# Patient Record
Sex: MALE | Race: WHITE | NOT HISPANIC OR LATINO | Employment: OTHER | URBAN - METROPOLITAN AREA
[De-identification: names, ages, dates, MRNs, and addresses within clinical notes are randomized per-mention and may not be internally consistent; named-entity substitution may affect disease eponyms.]

---

## 2024-03-06 ENCOUNTER — OFFICE VISIT (OUTPATIENT)
Dept: CARDIOLOGY | Facility: CLINIC | Age: 89
End: 2024-03-06
Payer: MEDICARE

## 2024-03-06 ENCOUNTER — HOSPITAL ENCOUNTER (OUTPATIENT)
Dept: CARDIOLOGY | Facility: CLINIC | Age: 89
Discharge: HOME | End: 2024-03-06
Payer: MEDICARE

## 2024-03-06 VITALS
SYSTOLIC BLOOD PRESSURE: 163 MMHG | DIASTOLIC BLOOD PRESSURE: 67 MMHG | BODY MASS INDEX: 23.62 KG/M2 | WEIGHT: 174.4 LBS | HEART RATE: 80 BPM | HEIGHT: 72 IN

## 2024-03-06 DIAGNOSIS — Z86.718 HISTORY OF DVT (DEEP VEIN THROMBOSIS): ICD-10-CM

## 2024-03-06 DIAGNOSIS — I10 PRIMARY HYPERTENSION: ICD-10-CM

## 2024-03-06 DIAGNOSIS — I48.0 PAROXYSMAL ATRIAL FIBRILLATION (MULTI): ICD-10-CM

## 2024-03-06 DIAGNOSIS — I48.0 PAROXYSMAL ATRIAL FIBRILLATION (MULTI): Primary | ICD-10-CM

## 2024-03-06 PROCEDURE — 1160F RVW MEDS BY RX/DR IN RCRD: CPT | Performed by: NURSE PRACTITIONER

## 2024-03-06 PROCEDURE — 3077F SYST BP >= 140 MM HG: CPT | Performed by: NURSE PRACTITIONER

## 2024-03-06 PROCEDURE — 93246 EXT ECG>7D<15D RECORDING: CPT

## 2024-03-06 PROCEDURE — 1159F MED LIST DOCD IN RCRD: CPT | Performed by: NURSE PRACTITIONER

## 2024-03-06 PROCEDURE — 99215 OFFICE O/P EST HI 40 MIN: CPT | Performed by: NURSE PRACTITIONER

## 2024-03-06 PROCEDURE — 1036F TOBACCO NON-USER: CPT | Performed by: NURSE PRACTITIONER

## 2024-03-06 PROCEDURE — 93248 EXT ECG>7D<15D REV&INTERPJ: CPT | Performed by: INTERNAL MEDICINE

## 2024-03-06 PROCEDURE — 3078F DIAST BP <80 MM HG: CPT | Performed by: NURSE PRACTITIONER

## 2024-03-06 RX ORDER — DILTIAZEM HYDROCHLORIDE 120 MG/1
120 CAPSULE, EXTENDED RELEASE ORAL
COMMUNITY
Start: 2017-02-20

## 2024-03-06 RX ORDER — CLONAZEPAM 0.5 MG/1
0.5 TABLET ORAL
COMMUNITY
Start: 2017-03-26

## 2024-03-06 RX ORDER — METOPROLOL SUCCINATE 50 MG/1
25 TABLET, EXTENDED RELEASE ORAL
COMMUNITY
Start: 2024-02-22 | End: 2024-03-23

## 2024-03-06 RX ORDER — DOXAZOSIN 8 MG/1
1 TABLET ORAL NIGHTLY
COMMUNITY
Start: 2018-11-30

## 2024-03-06 RX ORDER — BENAZEPRIL HYDROCHLORIDE 10 MG/1
10 TABLET ORAL
COMMUNITY

## 2024-03-06 RX ORDER — ATORVASTATIN CALCIUM 40 MG/1
40 TABLET, FILM COATED ORAL
COMMUNITY
Start: 2017-01-23

## 2024-03-06 RX ORDER — MAGNESIUM GLUCONATE 27 MG(500)
TABLET ORAL
COMMUNITY

## 2024-03-06 RX ORDER — IBUPROFEN/PSEUDOEPHEDRINE HCL 200MG-30MG
TABLET ORAL
COMMUNITY

## 2024-03-06 NOTE — PROGRESS NOTES
Chief Complaint:   Hospital Follow Up     History Of Present Illness:    Ruy Horton is a 89 y.o. male here for hospital follow up. Presented to ED with short episode of palpitations, SOB and weakness. EKG in ED showed afib at 90bpm. EP evaluated. Reported hx of afib. Was taken off AC due to ICH. Echo, reportedly, showed normal EF and no significant valvular abnormalities. I am unsure when he was first dx with afib. He is unaware of when he was first diagnosed with afib. We placed a holter in 2022 which was negative for afib.     He reports that when he was on diltiazem 180mg he started not to feel well. Reports episodes that felt like a panic attack.   It was decreased to 120mg and felt better. Until the episode occurred that lead to him going to ED. Again described as a panic attack.     Home heart rates 50-60s    Allergies:  Patient has no allergy information on record.    Review of Systems  All pertinent systems have been reviewed and are negative except for what is stated in the history of present illness.    All other systems have been reviewed and are negative and noncontributory to this patient's current ailments.     Visit Vitals  /67 (BP Location: Right arm, Patient Position: Sitting, BP Cuff Size: Adult)   Pulse 80   Ht 1.829 m (6')   Wt 79.1 kg (174 lb 6.4 oz)   BMI 23.65 kg/m²   Smoking Status Never   BSA 2 m²       Objective   Vitals reviewed.   Constitutional:       Appearance: Healthy appearance. Not in distress.   Neck:      Vascular: No JVR. JVD normal.   Pulmonary:      Effort: Pulmonary effort is normal.      Breath sounds: Normal breath sounds. No wheezing. No rhonchi. No rales.   Chest:      Chest wall: Not tender to palpatation.   Cardiovascular:      PMI at left midclavicular line. Normal rate. Regular rhythm. Normal S1. Normal S2.       Murmurs: There is no murmur.      No gallop.  No click. No rub.   Edema:     Peripheral edema absent.   Abdominal:      General: Bowel sounds are  normal.      Palpations: Abdomen is soft.      Tenderness: There is no abdominal tenderness.   Musculoskeletal:         General: No tenderness. Skin:     General: Skin is warm and dry.   Neurological:      General: No focal deficit present.      Mental Status: Alert and oriented to person, place and time.       Assessment/Plan   Diagnoses and all orders for this visit:  Paroxysmal atrial fibrillation (CMS/HCC)  - 2 week holter  - in NSR   - AC was not started due hx if ICH. I am not sure if he would be a candidate for watchman due to ICH. Will review with EP  - [id number]  - continue diltiazem and metoprolol   Primary hypertension  - stable   - he will bring readings to next apt  History of DVT (deep vein thrombosis)  -  see above  - hx of ICH      Current Outpatient Medications:     atorvastatin (Lipitor) 40 mg tablet, Take 1 tablet (40 mg) by mouth once daily., Disp: , Rfl:     benazepril (Lotensin) 10 mg tablet, Take 1 tablet (10 mg) by mouth once daily., Disp: , Rfl:     Cartia  mg 24 hr capsule, Take 1 capsule (120 mg) by mouth once daily., Disp: , Rfl:     clonazePAM (KlonoPIN) 0.5 mg tablet, Take 1 tablet (0.5 mg) by mouth., Disp: , Rfl:     doxazosin (Cardura) 8 mg tablet, Take 1 tablet (8 mg) by mouth once daily at bedtime., Disp: , Rfl:     Lactobac 40-Bifido 3-S.thermop (Probiotic) 100 billion cell capsule, Take by mouth., Disp: , Rfl:     magnesium, as gluconate, (Magonate) 27 mg magnesium (500 mg) tablet, Take by mouth., Disp: , Rfl:     metoprolol succinate XL (Toprol XL) 50 mg 24 hr tablet, Take 0.5 tablets (25 mg) by mouth once daily., Disp: , Rfl:     Exclusive of any other services or procedures performed, I, Susan BARBOUR, spent 40 minutes in duration for this visit today.  This time consisted of chart review, obtaining history, and/or performing the exam as documented above, as well as, documenting the clinical information for the encounter in the electronic record, discussing  treatment options, plans, and/or goals with patient, family, and/or caregiver, refilling medications, updating the electronic record, ordering medicines, lab work, imaging, referrals, and/or procedures as documented above and communicating with other [facility] professionals. I have discussed the results of laboratory, radiology, and cardiology studies with the patient and their family/caregiver.

## 2024-03-28 ENCOUNTER — TELEPHONE (OUTPATIENT)
Dept: CARDIOLOGY | Facility: CLINIC | Age: 89
End: 2024-03-28
Payer: MEDICARE

## 2024-03-28 LAB — BODY SURFACE AREA: 2 M2

## 2024-03-28 NOTE — TELEPHONE ENCOUNTER
----- Message from KM Corbett sent at 3/28/2024  9:52 AM EDT -----  Pls schedule follow up visit for review monitor

## 2024-04-03 ENCOUNTER — APPOINTMENT (OUTPATIENT)
Dept: CARDIOLOGY | Facility: CLINIC | Age: 89
End: 2024-04-03
Payer: MEDICARE

## 2024-04-03 NOTE — PROGRESS NOTES
Chief Complaint:   Hospital Follow Up     History Of Present Illness:    Ruy Horton is a 89 y.o. male here for hospital follow up. Presented to ED with short episode of palpitations, SOB and weakness. EKG in ED showed afib at 90bpm. EP evaluated. Reported hx of afib. Was taken off AC due to ICH. Echo, reportedly, showed normal EF and no significant valvular abnormalities. I am unsure when he was first dx with afib. He is unaware of when he was first diagnosed with afib. We placed a holter in 2022 which was negative for afib. Holter placed at last visit which showed SB with nonsustained PSVT and rare pac.     He reports that when he was on diltiazem 180mg he started not to feel well. Reports episodes that felt like a panic attack.   It was decreased to 120mg and felt better. Until the episode occurred that lead to him going to ED. Again described as a panic attack.     Home heart rates 50-60s    Allergies:  Patient has no known allergies.    Review of Systems  All pertinent systems have been reviewed and are negative except for what is stated in the history of present illness.    All other systems have been reviewed and are negative and noncontributory to this patient's current ailments.     Vitals reviewed.   Constitutional:       Appearance: Healthy appearance. Not in distress.   Neck:      Vascular: No JVR. JVD normal.   Pulmonary:      Effort: Pulmonary effort is normal.      Breath sounds: Normal breath sounds. No wheezing. No rhonchi. No rales.   Chest:      Chest wall: Not tender to palpatation.   Cardiovascular:      PMI at left midclavicular line. Normal rate. Regular rhythm. Normal S1. Normal S2.       Murmurs: There is no murmur.      No gallop.  No click. No rub.   Edema:     Peripheral edema absent.   Abdominal:      General: Bowel sounds are normal.      Palpations: Abdomen is soft.      Tenderness: There is no abdominal tenderness.   Musculoskeletal:         General: No tenderness. Skin:     General:  Skin is warm and dry.   Neurological:      General: No focal deficit present.      Mental Status: Alert and oriented to person, place and time.       Assessment/Plan   Diagnoses and all orders for this visit:  Paroxysmal atrial fibrillation (CMS/HCC)  - 2 week holter  - in NSR   - AC was not started due hx if ICH. I am not sure if he would be a candidate for watchman due to ICH. Will review with EP  - [id number]  - continue diltiazem and metoprolol   Primary hypertension  - stable   - he will bring readings to next apt  History of DVT (deep vein thrombosis)  -  see above  - hx of ICH      Current Outpatient Medications:     atorvastatin (Lipitor) 40 mg tablet, Take 1 tablet (40 mg) by mouth once daily., Disp: , Rfl:     benazepril (Lotensin) 10 mg tablet, Take 1 tablet (10 mg) by mouth once daily., Disp: , Rfl:     Cartia  mg 24 hr capsule, Take 1 capsule (120 mg) by mouth once daily., Disp: , Rfl:     clonazePAM (KlonoPIN) 0.5 mg tablet, Take 1 tablet (0.5 mg) by mouth., Disp: , Rfl:     doxazosin (Cardura) 8 mg tablet, Take 1 tablet (8 mg) by mouth once daily at bedtime., Disp: , Rfl:     Lactobac 40-Bifido 3-S.thermop (Probiotic) 100 billion cell capsule, Take by mouth., Disp: , Rfl:     magnesium, as gluconate, (Magonate) 27 mg magnesium (500 mg) tablet, Take by mouth., Disp: , Rfl:     metoprolol succinate XL (Toprol XL) 50 mg 24 hr tablet, Take 0.5 tablets (25 mg) by mouth once daily., Disp: , Rfl:     Exclusive of any other services or procedures performed, I, Susan BARBOUR, spent 40 minutes in duration for this visit today.  This time consisted of chart review, obtaining history, and/or performing the exam as documented above, as well as, documenting the clinical information for the encounter in the electronic record, discussing treatment options, plans, and/or goals with patient, family, and/or caregiver, refilling medications, updating the electronic record, ordering medicines, lab work,  imaging, referrals, and/or procedures as documented above and communicating with other [facility] professionals. I have discussed the results of laboratory, radiology, and cardiology studies with the patient and their family/caregiver.

## 2024-04-10 ENCOUNTER — OFFICE VISIT (OUTPATIENT)
Dept: CARDIOLOGY | Facility: CLINIC | Age: 89
End: 2024-04-10
Payer: MEDICARE

## 2024-04-10 VITALS
HEART RATE: 81 BPM | SYSTOLIC BLOOD PRESSURE: 166 MMHG | DIASTOLIC BLOOD PRESSURE: 66 MMHG | BODY MASS INDEX: 24.45 KG/M2 | HEIGHT: 72 IN | WEIGHT: 180.5 LBS

## 2024-04-10 DIAGNOSIS — I48.0 PAROXYSMAL ATRIAL FIBRILLATION (MULTI): Primary | ICD-10-CM

## 2024-04-10 DIAGNOSIS — Z86.718 HISTORY OF DVT (DEEP VEIN THROMBOSIS): ICD-10-CM

## 2024-04-10 DIAGNOSIS — I10 PRIMARY HYPERTENSION: ICD-10-CM

## 2024-04-10 PROCEDURE — 1159F MED LIST DOCD IN RCRD: CPT | Performed by: NURSE PRACTITIONER

## 2024-04-10 PROCEDURE — 1160F RVW MEDS BY RX/DR IN RCRD: CPT | Performed by: NURSE PRACTITIONER

## 2024-04-10 PROCEDURE — 3077F SYST BP >= 140 MM HG: CPT | Performed by: NURSE PRACTITIONER

## 2024-04-10 PROCEDURE — 3078F DIAST BP <80 MM HG: CPT | Performed by: NURSE PRACTITIONER

## 2024-04-10 PROCEDURE — 99214 OFFICE O/P EST MOD 30 MIN: CPT | Performed by: NURSE PRACTITIONER

## 2024-04-10 PROCEDURE — 1036F TOBACCO NON-USER: CPT | Performed by: NURSE PRACTITIONER

## 2024-04-10 NOTE — PROGRESS NOTES
Chief Complaint:   Hospital Follow Up     History Of Present Illness:    Ruy Horton is a 89 y.o. male here for hospital follow up. Presented to ED with short episode of palpitations, SOB and weakness. EKG in ED showed afib at 90bpm. EP evaluated. Reported hx of afib. Was taken off AC due to ICH. Echo, reportedly, showed normal EF and no significant valvular abnormalities. I am unsure when he was first dx with afib. He is unaware of when he was first diagnosed with afib. We placed a holter in 2022 which was negative for afib.     He reports that when he was on diltiazem 180mg he started not to feel well. Reports episodes that felt like a panic attack.   It was decreased to 120mg and felt better. Until the episode occurred that lead to him going to ED. Again described as a panic attack. He has only had one episode since last office visit. He is feeling well overall.    Home heart rates 50-60s. Avg HR 59, lowest 38bpm at 0754    Past Medical History:  He has no past medical history on file.    Past Surgical History:  He has no past surgical history on file.      Social History:  He reports that he has never smoked. He has never used smokeless tobacco. No history on file for alcohol use and drug use.    Family History:  Family History   Problem Relation Name Age of Onset    Hypertension Father      Hyperlipidemia Father         Allergies:  Patient has no known allergies.    Review of Systems  All pertinent systems have been reviewed and are negative except for what is stated in the history of present illness.    All other systems have been reviewed and are negative and noncontributory to this patient's current ailments.     Visit Vitals  /66 (BP Location: Right arm, Patient Position: Sitting, BP Cuff Size: Adult)   Pulse 81   Ht 1.829 m (6')   Wt 81.9 kg (180 lb 8 oz)   BMI 24.48 kg/m²   Smoking Status Never   BSA 2.04 m²       Objective   Vitals reviewed.   Constitutional:       Appearance: Healthy appearance.  Not in distress.   Neck:      Vascular: No JVR. JVD normal.   Pulmonary:      Effort: Pulmonary effort is normal.      Breath sounds: Normal breath sounds. No wheezing. No rhonchi. No rales.   Chest:      Chest wall: Not tender to palpatation.   Cardiovascular:      PMI at left midclavicular line. Normal rate. Regular rhythm. Normal S1. Normal S2.       Murmurs: There is no murmur.      No gallop.  No click. No rub.   Edema:     Peripheral edema absent.   Abdominal:      General: Bowel sounds are normal.      Palpations: Abdomen is soft.      Tenderness: There is no abdominal tenderness.   Musculoskeletal:         General: No tenderness. Skin:     General: Skin is warm and dry.   Neurological:      General: No focal deficit present.      Mental Status: Alert and oriented to person, place and time.   Psychiatric:         Attention and Perception: Attention normal.         Mood and Affect: Mood normal.       Assessment/Plan   Diagnoses and all orders for this visit:  Paroxysmal atrial fibrillation (CMS/HCC)  - Monitor negative for afib  - in NSR   - AC was not started due hx if ICH. I did discuss with EP and he is a candidate for watchman, we did discuss.   - [id number]  - continue diltiazem and metoprolol   Primary hypertension  - stable   - monitor at home.   History of DVT (deep vein thrombosis)  -  see above  - hx of ICH    Follow up annually     Current Outpatient Medications:     atorvastatin (Lipitor) 40 mg tablet, Take 1 tablet (40 mg) by mouth once daily., Disp: , Rfl:     benazepril (Lotensin) 10 mg tablet, Take 1 tablet (10 mg) by mouth once daily., Disp: , Rfl:     Cartia  mg 24 hr capsule, Take 1 capsule (120 mg) by mouth once daily., Disp: , Rfl:     clonazePAM (KlonoPIN) 0.5 mg tablet, Take 1 tablet (0.5 mg) by mouth., Disp: , Rfl:     doxazosin (Cardura) 8 mg tablet, Take 1 tablet (8 mg) by mouth once daily at bedtime., Disp: , Rfl:     Lactobac 40-Bifido 3-S.thermop (Probiotic) 100 billion  cell capsule, Take by mouth., Disp: , Rfl:     magnesium, as gluconate, (Magonate) 27 mg magnesium (500 mg) tablet, Take by mouth., Disp: , Rfl:     metoprolol succinate XL (Toprol XL) 50 mg 24 hr tablet, Take 0.5 tablets (25 mg) by mouth once daily., Disp: , Rfl:     Exclusive of any other services or procedures performed, I, Susan BARBOUR, spent 40 minutes in duration for this visit today.  This time consisted of chart review, obtaining history, and/or performing the exam as documented above, as well as, documenting the clinical information for the encounter in the electronic record, discussing treatment options, plans, and/or goals with patient, family, and/or caregiver, refilling medications, updating the electronic record, ordering medicines, lab work, imaging, referrals, and/or procedures as documented above and communicating with other [facility] professionals. I have discussed the results of laboratory, radiology, and cardiology studies with the patient and their family/caregiver.

## 2024-04-17 ENCOUNTER — TELEPHONE (OUTPATIENT)
Dept: CARDIOLOGY | Facility: CLINIC | Age: 89
End: 2024-04-17
Payer: MEDICARE

## 2024-10-15 ENCOUNTER — HOSPITAL ENCOUNTER (OUTPATIENT)
Dept: CARDIOLOGY | Facility: CLINIC | Age: 89
Discharge: HOME | End: 2024-10-15
Payer: MEDICARE

## 2024-10-15 ENCOUNTER — TELEPHONE (OUTPATIENT)
Dept: CARDIOLOGY | Facility: CLINIC | Age: 89
End: 2024-10-15

## 2024-10-15 DIAGNOSIS — I48.0 PAROXYSMAL ATRIAL FIBRILLATION (MULTI): ICD-10-CM

## 2024-10-15 NOTE — TELEPHONE ENCOUNTER
Reviewed with pt verbalized understanding.     Would like monitor sent to his other address  [address], [address]  [place] [address]

## 2024-11-06 ENCOUNTER — TELEPHONE (OUTPATIENT)
Dept: CARDIOLOGY | Facility: CLINIC | Age: 89
End: 2024-11-06
Payer: MEDICARE

## 2024-11-06 NOTE — TELEPHONE ENCOUNTER
----- Message from Susan Iglesias sent at 11/6/2024  9:43 AM EST -----  Please get patient in to discuss monitor results.   He did not have any episodes of afib on monitor

## 2024-11-19 ENCOUNTER — OFFICE VISIT (OUTPATIENT)
Dept: CARDIOLOGY | Facility: CLINIC | Age: 89
End: 2024-11-19
Payer: MEDICARE

## 2024-11-19 VITALS
BODY MASS INDEX: 24.28 KG/M2 | HEART RATE: 89 BPM | SYSTOLIC BLOOD PRESSURE: 147 MMHG | DIASTOLIC BLOOD PRESSURE: 63 MMHG | WEIGHT: 179 LBS

## 2024-11-19 DIAGNOSIS — I48.0 PAROXYSMAL ATRIAL FIBRILLATION (MULTI): Primary | ICD-10-CM

## 2024-11-19 DIAGNOSIS — Z86.718 HISTORY OF DVT (DEEP VEIN THROMBOSIS): ICD-10-CM

## 2024-11-19 DIAGNOSIS — I10 PRIMARY HYPERTENSION: ICD-10-CM

## 2024-11-19 PROCEDURE — 3077F SYST BP >= 140 MM HG: CPT | Performed by: NURSE PRACTITIONER

## 2024-11-19 PROCEDURE — 99214 OFFICE O/P EST MOD 30 MIN: CPT | Performed by: NURSE PRACTITIONER

## 2024-11-19 PROCEDURE — 1160F RVW MEDS BY RX/DR IN RCRD: CPT | Performed by: NURSE PRACTITIONER

## 2024-11-19 PROCEDURE — 1036F TOBACCO NON-USER: CPT | Performed by: NURSE PRACTITIONER

## 2024-11-19 PROCEDURE — 3078F DIAST BP <80 MM HG: CPT | Performed by: NURSE PRACTITIONER

## 2024-11-19 PROCEDURE — 1159F MED LIST DOCD IN RCRD: CPT | Performed by: NURSE PRACTITIONER

## 2024-11-19 NOTE — PROGRESS NOTES
Chief Complaint:   Hospital Follow Up     History Of Present Illness:    Ruy Horton is a 89 y.o. male here for hospital follow up. Presented to ED with short episode of palpitations, SOB and weakness. EKG in ED showed afib at 90bpm. EP evaluated. Reported hx of afib. Was taken off AC due to ICH. Echo, reportedly, showed normal EF and no significant valvular abnormalities. I am unsure when he was first dx with afib. He is unaware of when he was first diagnosed with afib. We placed a holter in 2022 which was negative for afib.     He reports that when he was on diltiazem 180mg he started not to feel well. Reports episodes that felt like a panic attack.   It was decreased to 120mg and felt better. Until the episode occurred that lead to him going to ED. Again described as a panic attack. He has only had one episode since last office visit. He is feeling well overall.    Holter placed at last visit and showed Nonsustained PSVT, NSVT and rare pac/pvc.     Skips a beat every once awhile. He has not had another panic attack moment. Has been feeling fine.    Home heart rates 50-60s. Avg HR 59, lowest 38bpm at 0754    Past Medical History:  He has no past medical history on file.    Past Surgical History:  He has no past surgical history on file.      Social History:  He reports that he has never smoked. He has never used smokeless tobacco. No history on file for alcohol use and drug use.    Family History:  Family History   Problem Relation Name Age of Onset    Hypertension Father      Hyperlipidemia Father         Allergies:  Patient has no known allergies.    Review of Systems  All pertinent systems have been reviewed and are negative except for what is stated in the history of present illness.    All other systems have been reviewed and are negative and noncontributory to this patient's current ailments.     Visit Vitals  /63 (BP Location: Right arm, Patient Position: Sitting, BP Cuff Size: Adult)   Pulse 89   Wt  81.2 kg (179 lb)   BMI 24.28 kg/m²   Smoking Status Never   BSA 2.03 m²       Objective   Vitals reviewed.   Constitutional:       Appearance: Healthy appearance. Not in distress.   Neck:      Vascular: No JVR. JVD normal.   Pulmonary:      Effort: Pulmonary effort is normal.      Breath sounds: Normal breath sounds. No wheezing. No rhonchi. No rales.   Chest:      Chest wall: Not tender to palpatation.   Cardiovascular:      PMI at left midclavicular line. Normal rate. Regular rhythm. Normal S1. Normal S2.       Murmurs: There is no murmur.      No gallop.  No click. No rub.   Edema:     Peripheral edema absent.   Abdominal:      General: Bowel sounds are normal.      Palpations: Abdomen is soft.      Tenderness: There is no abdominal tenderness.   Musculoskeletal:         General: No tenderness. Skin:     General: Skin is warm and dry.   Neurological:      General: No focal deficit present.      Mental Status: Alert and oriented to person, place and time.   Psychiatric:         Attention and Perception: Attention normal.         Mood and Affect: Mood normal.       Assessment/Plan   Diagnoses and all orders for this visit:  Paroxysmal atrial fibrillation (CMS/HCC)  - Monitor negative for afib  - in NSR   - AC was not started due hx if ICH. I did discuss with EP and he is a candidate for watchman, if needed  - holter was negative for afib  - [id number]  - continue diltiazem and metoprolol   - panic attack sensation unlikely arrhythmia  Primary hypertension  - stable   - monitor at home.   History of DVT (deep vein thrombosis)  -  see above  - hx of ICH    Follow up annually     Current Outpatient Medications:     atorvastatin (Lipitor) 40 mg tablet, Take 1 tablet (40 mg) by mouth once daily., Disp: , Rfl:     benazepril (Lotensin) 10 mg tablet, Take 1 tablet (10 mg) by mouth once daily., Disp: , Rfl:     Cartia  mg 24 hr capsule, Take 1 capsule (120 mg) by mouth once daily., Disp: , Rfl:     clonazePAM  (KlonoPIN) 0.5 mg tablet, Take 1 tablet (0.5 mg) by mouth., Disp: , Rfl:     doxazosin (Cardura) 8 mg tablet, Take 1 tablet (8 mg) by mouth once daily at bedtime., Disp: , Rfl:     Lactobac 40-Bifido 3-S.thermop (Probiotic) 100 billion cell capsule, Take by mouth., Disp: , Rfl:     magnesium, as gluconate, (Magonate) 27 mg magnesium (500 mg) tablet, Take by mouth., Disp: , Rfl:     metoprolol succinate XL (Toprol XL) 50 mg 24 hr tablet, Take 0.5 tablets (25 mg) by mouth once daily., Disp: , Rfl:     I reviewed holter results with patient    Exclusive of any other services or procedures performed, I, Susan BARBOUR, spent 20 minutes in duration for this visit today.  This time consisted of chart review, obtaining history, and/or performing the exam as documented above, as well as, documenting the clinical information for the encounter in the electronic record, discussing treatment options, plans, and/or goals with patient, family, and/or caregiver, refilling medications, updating the electronic record, ordering medicines, lab work, imaging, referrals, and/or procedures as documented above and communicating with other [facility] professionals. I have discussed the results of laboratory, radiology, and cardiology studies with the patient and their family/caregiver.

## 2024-12-02 ENCOUNTER — TELEPHONE (OUTPATIENT)
Dept: CARDIOLOGY | Facility: CLINIC | Age: 89
End: 2024-12-02
Payer: MEDICARE

## 2024-12-02 NOTE — TELEPHONE ENCOUNTER
pt states he needs to talk to Susan about the side effects from his anxiety medication. reviewed this was prescribed by Susan. pt to check is bottles. venlafaxine hcl (effexor) rxed elsewhere. Pt instructed to call rxing doctor due to having side effects. Pt still insisting that Susan is aware and wanting to talk to Susan.

## 2025-05-07 ENCOUNTER — TELEPHONE (OUTPATIENT)
Dept: CARDIOLOGY | Facility: CLINIC | Age: OVER 89
End: 2025-05-07
Payer: MEDICARE